# Patient Record
Sex: FEMALE | Race: WHITE | URBAN - METROPOLITAN AREA
[De-identification: names, ages, dates, MRNs, and addresses within clinical notes are randomized per-mention and may not be internally consistent; named-entity substitution may affect disease eponyms.]

---

## 2017-01-28 ENCOUNTER — OFFICE VISIT (OUTPATIENT)
Dept: URGENT CARE | Facility: URGENT CARE | Age: 62
End: 2017-01-28
Payer: COMMERCIAL

## 2017-01-28 VITALS
HEIGHT: 62 IN | BODY MASS INDEX: 29.08 KG/M2 | SYSTOLIC BLOOD PRESSURE: 138 MMHG | TEMPERATURE: 98.1 F | WEIGHT: 158 LBS | OXYGEN SATURATION: 99 % | HEART RATE: 107 BPM | RESPIRATION RATE: 14 BRPM | DIASTOLIC BLOOD PRESSURE: 84 MMHG

## 2017-01-28 DIAGNOSIS — K22.4 ESOPHAGEAL SPASM: Primary | ICD-10-CM

## 2017-01-28 DIAGNOSIS — R07.89 CHEST PRESSURE: ICD-10-CM

## 2017-01-28 PROCEDURE — 93000 ELECTROCARDIOGRAM COMPLETE: CPT | Performed by: FAMILY MEDICINE

## 2017-01-28 PROCEDURE — 99202 OFFICE O/P NEW SF 15 MIN: CPT | Performed by: FAMILY MEDICINE

## 2017-01-28 NOTE — PROGRESS NOTES
"SUBJECTIVE: Marie Jacome is a 61 year old female who at 4:50PM today was eating cheese, ham and snap peas. Thought something got stuck in her throat, then started to feel spasms in throat and pain in band across upper chest. Does have transient shortness of breath with throat spasms.  This has never happened before.  Has started to feel better for the past 10 minutes.  No tongue swelling.  Normal speech throughout this episode.  No itching of her throat. Has been able to drink water okay.  The episodes come and go and last 15 seconds or so.  Does not feel like anything is stuck in the throat.   OBJECTIVE:   /84 mmHg  Pulse 107  Temp(Src) 98.1  F (36.7  C) (Oral)  Resp 14  Ht 5' 2\" (1.575 m)  Wt 158 lb (71.668 kg)  BMI 28.89 kg/m2  SpO2 99%   Appears alert and flushed.  Ears: normal  Nose: normal  Oropharynx: no erythema, no swelling, no foreign bodies, no exudates, throat culture taken   Neck: normal, supple and no adenopathy  Lungs: chest clear to IPPA, no tachypnea, retractions or cyanosis   CV: increased rate, normal S1, S2, no murmurs  Abdomen: +BS, soft and NT, ND, no HSM    EKG: NSR at 100 bpm.  No ischemic changes.  I measure the DC interval at 0.12 sec.      ASSESSMENT: Esophageal spasm    PLAN: Per orders. Refused GI cocktail.  Push fluids, use acetaminophen or other OTC analgesic. Avoid extremes of cold or hot.  Avoid hard foods for now.  Trial of antacid for a few days prn.   RTC if not improving as anticipated.     Linda Luna MD    "

## 2017-01-28 NOTE — MR AVS SNAPSHOT
After Visit Summary   1/28/2017    Marie Jacome    MRN: [id number]           Patient Information     Date Of Birth          1955        Visit Information        Provider Department      1/28/2017 5:15 PM Linda Ramsey MD [facility]        Today's Diagnoses     Esophageal spasm    -  1     Chest pressure           Care Instructions      Esophageal Spasm    The esophagus is a muscular tube that joins your mouth to your stomach. Contractions in the muscles in the esophagus help food move down to the stomach. When these muscles tighten or contract abnormally, it is known as spasm.   When the muscles spasm, it may feel like food is stuck and won t go down. It may cause a feeling of heartburn or a squeezing type of chest pain. The pain may spread to the neck, arm or back. If you try to swallow more food or liquid during a spasm, it may come back up within seconds.  Esophageal spasm is more common in people with gastroesophageal reflux disease (also called GERD). Very hot or very cold foods or foods that are not chewed enough before swallowing may trigger a spasm.  Home care  Medicines  Your healthcare provider may prescribe medicines to help reduce your symptoms. If you have an underlying condition, such as GERD, your healthcare provider may prescribe medicines to help manage it.   Take all medicines as prescribed. Do not take any medicines without talking to your healthcare provider first.  Diet    Avoid any foods that seem to cause spasm. This may include very hot or very cold foods.    Eat slowly and chew food well before swallowing.     Avoid alcohol, caffeine, and tobacco, which can delay healing and worsen the problem.    Try eating smaller meals. Have small snacks in between.  Follow-up care  Follow up with your healthcare provider or as advised by our staff.  When to seek medical advice  Call your healthcare provider if any of the following  "occur:    Food that feels \"stuck\" in the esophagus for more than 30 minutes    Inability to swallow solid or liquids for more than 30 minutes    Symptoms that feel like esophageal spasm but occur with heavy sweating, dizziness, or shortness of breath    Change in the usual patterns of your symptoms of esophageal spasm (new pattern of spreading to the neck, back, shoulder or arm; pain that is more severe than usual)    2982-5955 [employer]. [address] [place] [address]. All rights reserved. This information is not intended as a substitute for professional medical care. Always follow your healthcare professional's instructions.              Follow-ups after your visit        Who to contact     If you have questions or need follow up information about today's clinic visit or your schedule please contact [facility] directly at [phone].  Normal or non-critical lab and imaging results will be communicated to you by [employer], letter or phone within 4 business days after the clinic has received the results. If you do not hear from us within 7 days, please contact the clinic through [employer] or phone. If you have a critical or abnormal lab result, we will notify you by phone as soon as possible.  Submit refill requests through [employer] or call your pharmacy and they will forward the refill request to us. Please allow 3 business days for your refill to be completed.          Additional Information About Your Visit        [employer] Information     [employer] lets you send messages to your doctor, view your test results, renew your prescriptions, schedule appointments and more. To sign up, go to [email or web address] . Click on \"Log in\" on the left side of the screen, which will take you to the Welcome page. Then click on \"Sign up Now\" on the right side of the page.     You will be asked to enter the access code listed below, as well as some personal information. Please follow " "the directions to create your username and password.     Your access code is: [email or web address]  Expires: 2017  6:01 PM     Your access code will  in 90 days. If you need help or a new code, please call your [place] clinic or [phone].        Care EveryWhere ID     This is your Care EveryWhere ID. This could be used by other organizations to access your [place] medical records  [id number]        Your Vitals Were     Pulse Temperature Respirations Height BMI (Body Mass Index) Pulse Oximetry    107 98.1  F (36.7  C) (Oral) 14 5' 2\" (1.575 m) 28.89 kg/m2 99%       Blood Pressure from Last 3 Encounters:   17 138/84    Weight from Last 3 Encounters:   17 158 lb (71.668 kg)              We Performed the Following     EKG 12-lead complete w/read - Clinics        Primary Care Provider    Provider Outside       No address on file        Thank you!     Thank you for choosing [facility]  for your care. Our goal is always to provide you with excellent care. Hearing back from our patients is one way we can continue to improve our services. Please take a few minutes to complete the written survey that you may receive in the mail after your visit with us. Thank you!             Your Updated Medication List - Protect others around you: Learn how to safely use, store and throw away your medicines at www.disposemymeds.org.      Notice  As of 2017  6:01 PM    You have not been prescribed any medications.      "

## 2017-01-28 NOTE — NURSING NOTE
"Chief Complaint   Patient presents with     Urgent Care     Throat Problem     4:50PM today was eating cheese, ham and snap peas.  Thought something got stuck in her throat, then started to feel spasms in throat and pain in band across upper chest.  Does have transient shortness of breath with throat spasms.     Initial /84 mmHg  Pulse 107  Temp(Src) 98.1  F (36.7  C) (Oral)  Resp 14  Ht 5' 2\" (1.575 m)  Wt 158 lb (71.668 kg)  BMI 28.89 kg/m2  SpO2 99% Estimated body mass index is 28.89 kg/(m^2) as calculated from the following:    Height as of this encounter: 5' 2\" (1.575 m).    Weight as of this encounter: 158 lb (71.668 kg)..  BP completed using cuff size: brandyn Barba RN  "

## 2017-01-29 NOTE — PATIENT INSTRUCTIONS
"  Esophageal Spasm    The esophagus is a muscular tube that joins your mouth to your stomach. Contractions in the muscles in the esophagus help food move down to the stomach. When these muscles tighten or contract abnormally, it is known as spasm.   When the muscles spasm, it may feel like food is stuck and won t go down. It may cause a feeling of heartburn or a squeezing type of chest pain. The pain may spread to the neck, arm or back. If you try to swallow more food or liquid during a spasm, it may come back up within seconds.  Esophageal spasm is more common in people with gastroesophageal reflux disease (also called GERD). Very hot or very cold foods or foods that are not chewed enough before swallowing may trigger a spasm.  Home care  Medicines  Your healthcare provider may prescribe medicines to help reduce your symptoms. If you have an underlying condition, such as GERD, your healthcare provider may prescribe medicines to help manage it.   Take all medicines as prescribed. Do not take any medicines without talking to your healthcare provider first.  Diet    Avoid any foods that seem to cause spasm. This may include very hot or very cold foods.    Eat slowly and chew food well before swallowing.     Avoid alcohol, caffeine, and tobacco, which can delay healing and worsen the problem.    Try eating smaller meals. Have small snacks in between.  Follow-up care  Follow up with your healthcare provider or as advised by our staff.  When to seek medical advice  Call your healthcare provider if any of the following occur:    Food that feels \"stuck\" in the esophagus for more than 30 minutes    Inability to swallow solid or liquids for more than 30 minutes    Symptoms that feel like esophageal spasm but occur with heavy sweating, dizziness, or shortness of breath    Change in the usual patterns of your symptoms of esophageal spasm (new pattern of spreading to the neck, back, shoulder or arm; pain that is more severe " than usual)    6752-4797 [employer]. [address] [place] [address], [place] [address]. All rights reserved. This information is not intended as a substitute for professional medical care. Always follow your healthcare professional's instructions.